# Patient Record
Sex: MALE | Race: WHITE | NOT HISPANIC OR LATINO | Employment: FULL TIME | URBAN - METROPOLITAN AREA
[De-identification: names, ages, dates, MRNs, and addresses within clinical notes are randomized per-mention and may not be internally consistent; named-entity substitution may affect disease eponyms.]

---
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
## 2025-06-25 ENCOUNTER — OFFICE VISIT (OUTPATIENT)
Dept: URGENT CARE | Facility: CLINIC | Age: 20
End: 2025-06-25
Payer: MEDICAID

## 2025-06-25 VITALS
OXYGEN SATURATION: 98 % | DIASTOLIC BLOOD PRESSURE: 81 MMHG | BODY MASS INDEX: 36.94 KG/M2 | SYSTOLIC BLOOD PRESSURE: 111 MMHG | HEART RATE: 94 BPM | RESPIRATION RATE: 19 BRPM | WEIGHT: 221.69 LBS | HEIGHT: 65 IN | TEMPERATURE: 99 F

## 2025-06-25 DIAGNOSIS — R52 BODY ACHES: ICD-10-CM

## 2025-06-25 DIAGNOSIS — R11.10 VOMITING, UNSPECIFIED VOMITING TYPE, UNSPECIFIED WHETHER NAUSEA PRESENT: Primary | ICD-10-CM

## 2025-06-25 LAB
CTP QC/QA: YES
SARS-COV+SARS-COV-2 AG RESP QL IA.RAPID: NEGATIVE

## 2025-06-25 PROCEDURE — 99213 OFFICE O/P EST LOW 20 MIN: CPT | Mod: S$GLB,,,

## 2025-06-25 PROCEDURE — 87811 SARS-COV-2 COVID19 W/OPTIC: CPT | Mod: QW,S$GLB,,

## 2025-06-25 RX ORDER — ONDANSETRON 4 MG/1
4 TABLET, ORALLY DISINTEGRATING ORAL EVERY 6 HOURS PRN
Qty: 6 TABLET | Refills: 0 | Status: SHIPPED | OUTPATIENT
Start: 2025-06-25

## 2025-06-25 NOTE — LETTER
June 25, 2025      [facility] [place]  [address]  [place] [address]  Phone: [phone]  Fax: [phone]       Patient: Louis Davidson   YOB: 2005  Date of Visit: 06/25/2025    To Whom It May Concern:    Minh Davidson  was at [facility] on 06/25/2025. The patient may return to work/school in 2-3 days with no restrictions. If you have any questions or concerns, or if I can be of further assistance, please do not hesitate to contact me.    Sincerely,    Brandi Blackwood PA-C

## 2025-06-25 NOTE — PATIENT INSTRUCTIONS
You must understand that you have received treatment at an Urgent Care facility only, and that you may be  released before all of your medical problems are known or treated. Urgent Care facilities are not equipped to  handle life threatening emergencies. It is recommended that you seek care at an Emergency Department for  further evaluation of worsening or concerning symptoms, or possibly life threatening conditions as  discussed.     Eat smaller meals, refrain from eating large amounts of irritating foods (fatty foods, milk products, chocolate, and caffeine), maintaining a food diary, advancing fluids as tolerated, refraining from alcohol consumption, decreasing stress, and increasing exercise.    Maintain Fluid Intake: Pedialyte/Gatorade, Juice, Non-caffenated Pop (Sprite,7-Up).  Clear liquid diet, advance as tolerated  Please follow up with Primary care physician in 2-3 days. Go to ER should symptoms worsen or not improve.      Patient Instructions   1.  Take all medications as directed. If you have been prescribed antibiotics, make sure to complete them.   2.  Rest and keep yourself/patient well hydrated. For adults, it is recommended to drink at least 8-10 glasses of water daily.   3.  For patients above 6 months of age who are not allergic to and are not on anticoagulants, you can alternate Tylenol and Motrin every 4-6 hours for fever above 100.4F and/or pain.  For patients less than 6 months of age, allergic to or intolerant to NSAIDS, have gastritis, gastric ulcers, or history of GI bleeds, are pregnant, or are on anticoagulant therapy, you can take Tylenol every 4 hours as needed for fever above 100.4F and/or pain.   4. You should schedule a follow-up appointment with your Primary Care Provider/Pediatrician for recheck in 2-3 days or as directed at this visit.   5.  If your condition fails to improve in a timely manner, you should receive another evaluation by your Primary Care Provider/Pediatrician to  discuss your concerns or return to urgent care for a recheck.  If your condition worsens at any time, you should report immediately to your nearest Emergency Department for further evaluation. **You must understand that you have received Urgent Care treatment only and that you may be released before all of your medical problems are known or treated. You, the patient, are responsible to arrange for follow-up care as instructed.

## 2025-06-25 NOTE — PROGRESS NOTES
"Subjective:      Patient ID: Louis Davidson is a 20 y.o. male.    Vitals:  height is 5' 5" (1.651 m) and weight is 100.5 kg (221 lb 10.8 oz). His oral temperature is 99 °F (37.2 °C). His blood pressure is 111/81 and his pulse is 94. His respiration is 19 and oxygen saturation is 98%.     Chief Complaint: Emesis    Patient states symptoms started yesterday with fatigue, body aches, nausea/vomiting, mild headache. Reports 4 episode of emesis. Denies hematemesis, diarrhea, dysuria, changes in urine color, melena. Last bowel movement yesterday. Sick contact at work. Pt ate cracker s and drank Power-aide today without emesis.     Emesis   This is a new problem. The current episode started yesterday. The problem occurs 2 to 4 times per day. The problem has been gradually worsening. The emesis has an appearance of bile. There has been no fever. Associated symptoms include abdominal pain, chills and headaches. Pertinent negatives include no diarrhea. He has tried nothing for the symptoms. The treatment provided no relief.       Constitution: Positive for chills.   Gastrointestinal:  Positive for abdominal pain and vomiting. Negative for diarrhea.   Neurological:  Positive for headaches.      Objective:     Physical Exam   Constitutional: He is oriented to person, place, and time. He appears well-developed.  Non-toxic appearance. He does not appear ill. No distress.   HENT:   Head: Normocephalic and atraumatic.   Ears:   Right Ear: Tympanic membrane, external ear and ear canal normal.   Left Ear: Tympanic membrane, external ear and ear canal normal.   Nose: Nose normal.   Mouth/Throat: Mucous membranes are normal. Mucous membranes are moist.   Eyes: Conjunctivae and lids are normal.   Neck: Trachea normal. Neck supple.   Cardiovascular: Normal rate and regular rhythm.   Pulmonary/Chest: Effort normal and breath sounds normal. No respiratory distress. He has no wheezes.   Abdominal: Normal appearance. He exhibits no " distension. Soft. There is no abdominal tenderness. There is no rebound and no guarding.   Musculoskeletal: Normal range of motion.         General: Normal range of motion.   Neurological: He is alert and oriented to person, place, and time. He has normal strength.   Skin: Skin is warm, dry, intact, not diaphoretic and not pale.   Psychiatric: His speech is normal and behavior is normal. Judgment and thought content normal.   Nursing note and vitals reviewed.    Results for orders placed or performed in visit on 06/25/25   SARS Coronavirus 2 Antigen, POCT Manual Read    Collection Time: 06/25/25 10:11 AM   Result Value Ref Range    SARS Coronavirus 2 Antigen Negative Negative, Presumptive Negative     Acceptable Yes        Assessment:     1. Vomiting, unspecified vomiting type, unspecified whether nausea present    2. Body aches        Plan:       Vomiting, unspecified vomiting type, unspecified whether nausea present  -     SARS Coronavirus 2 Antigen, POCT Manual Read  -     ondansetron (ZOFRAN-ODT) 4 MG TbDL; Take 1 tablet (4 mg total) by mouth every 6 (six) hours as needed (vomiting).  Dispense: 6 tablet; Refill: 0    Body aches      Pt declined flu test. Covid is negative, discussed results with patient.   VS stable, afebrile. Pt tolerating fluids and bland diet at home.      Eat smaller meals, refrain from eating large amounts of irritating foods (fatty foods, milk products, chocolate, and caffeine), maintaining a food diary, advancing fluids as tolerated, refraining from alcohol consumption, decreasing stress, and increasing exercise.    Maintain Fluid Intake: Pedialyte/Gatorade, Juice, Non-caffenated Pop (Jarod,7-Up).  Clear liquid diet, advance as tolerated  Please follow up with Primary care physician in 2-3 days. Go to ER should symptoms worsen or not improve.        Patient Instructions   1.  Take all medications as directed. If you have been prescribed antibiotics, make sure to complete  them.   2.  Rest and keep yourself/patient well hydrated. For adults, it is recommended to drink at least 8-10 glasses of water daily.   3.  For patients above 6 months of age who are not allergic to and are not on anticoagulants, you can alternate Tylenol and Motrin every 4-6 hours for fever above 100.4F and/or pain.  For patients less than 6 months of age, allergic to or intolerant to NSAIDS, have gastritis, gastric ulcers, or history of GI bleeds, are pregnant, or are on anticoagulant therapy, you can take Tylenol every 4 hours as needed for fever above 100.4F and/or pain.   4. You should schedule a follow-up appointment with your Primary Care Provider/Pediatrician for recheck in 2-3 days or as directed at this visit.   5.  If your condition fails to improve in a timely manner, you should receive another evaluation by your Primary Care Provider/Pediatrician to discuss your concerns or return to urgent care for a recheck.  If your condition worsens at any time, you should report immediately to your nearest Emergency Department for further evaluation. **You must understand that you have received Urgent Care treatment only and that you may be released before all of your medical problems are known or treated. You, the patient, are responsible to arrange for follow-up care as instructed.